# Patient Record
Sex: MALE | Race: OTHER
[De-identification: names, ages, dates, MRNs, and addresses within clinical notes are randomized per-mention and may not be internally consistent; named-entity substitution may affect disease eponyms.]

---

## 2020-01-28 PROBLEM — Z00.00 ENCOUNTER FOR PREVENTIVE HEALTH EXAMINATION: Status: ACTIVE | Noted: 2020-01-28

## 2020-02-03 ENCOUNTER — APPOINTMENT (OUTPATIENT)
Dept: NEUROLOGY | Facility: CLINIC | Age: 50
End: 2020-02-03
Payer: COMMERCIAL

## 2020-02-03 VITALS
WEIGHT: 180 LBS | BODY MASS INDEX: 28.25 KG/M2 | HEIGHT: 67 IN | TEMPERATURE: 98.1 F | HEART RATE: 92 BPM | OXYGEN SATURATION: 97 % | SYSTOLIC BLOOD PRESSURE: 138 MMHG | DIASTOLIC BLOOD PRESSURE: 92 MMHG

## 2020-02-03 DIAGNOSIS — Z86.39 PERSONAL HISTORY OF OTHER ENDOCRINE, NUTRITIONAL AND METABOLIC DISEASE: ICD-10-CM

## 2020-02-03 DIAGNOSIS — Z86.69 PERSONAL HISTORY OF OTHER DISEASES OF THE NERVOUS SYSTEM AND SENSE ORGANS: ICD-10-CM

## 2020-02-03 DIAGNOSIS — Z82.0 FAMILY HISTORY OF EPILEPSY AND OTHER DISEASES OF THE NERVOUS SYSTEM: ICD-10-CM

## 2020-02-03 DIAGNOSIS — G47.33 OBSTRUCTIVE SLEEP APNEA (ADULT) (PEDIATRIC): ICD-10-CM

## 2020-02-03 DIAGNOSIS — Z82.3 FAMILY HISTORY OF STROKE: ICD-10-CM

## 2020-02-03 DIAGNOSIS — Z87.09 PERSONAL HISTORY OF OTHER DISEASES OF THE RESPIRATORY SYSTEM: ICD-10-CM

## 2020-02-03 DIAGNOSIS — Z78.9 OTHER SPECIFIED HEALTH STATUS: ICD-10-CM

## 2020-02-03 DIAGNOSIS — E78.00 PURE HYPERCHOLESTEROLEMIA, UNSPECIFIED: ICD-10-CM

## 2020-02-03 DIAGNOSIS — Z83.438 FAMILY HISTORY OF OTHER DISORDER OF LIPOPROTEIN METABOLISM AND OTHER LIPIDEMIA: ICD-10-CM

## 2020-02-03 DIAGNOSIS — Z87.19 PERSONAL HISTORY OF OTHER DISEASES OF THE DIGESTIVE SYSTEM: ICD-10-CM

## 2020-02-03 PROCEDURE — 99205 OFFICE O/P NEW HI 60 MIN: CPT

## 2020-02-03 RX ORDER — RANITIDINE 150 MG/1
150 TABLET, FILM COATED ORAL
Refills: 0 | Status: ACTIVE | COMMUNITY

## 2020-02-03 RX ORDER — ATORVASTATIN CALCIUM 40 MG/1
40 TABLET, FILM COATED ORAL
Refills: 0 | Status: ACTIVE | COMMUNITY

## 2020-02-04 NOTE — PHYSICAL EXAM
[General Appearance - Alert] : alert [General Appearance - In No Acute Distress] : in no acute distress [General Appearance - Well-Appearing] : healthy appearing [] : normal voice and communication [Mood] : the mood was normal [Affect] : the affect was normal [Cranial Nerves Optic (II)] : visual acuity intact bilaterally,  visual fields full to confrontation, pupils equal round and reactive to light [Cranial Nerves Facial (VII)] : face symmetrical [Cranial Nerves Vestibulocochlear (VIII)] : hearing was intact bilaterally [Cranial Nerves Oculomotor (III)] : extraocular motion intact [Cranial Nerves Trigeminal (V)] : facial sensation intact symmetrically [Cranial Nerves Glossopharyngeal (IX)] : tongue and palate midline [Cranial Nerves Accessory (XI - Cranial And Spinal)] : head turning and shoulder shrug symmetric [Cranial Nerves Hypoglossal (XII)] : there was no tongue deviation with protrusion [Motor Tone] : muscle tone was normal in all four extremities [Motor Handedness Left-Handed] : the patient is left hand dominant [Motor Strength] : muscle strength was normal in all four extremities [1+] : Brachioradialis left 1+ [0] : Patella left 0 [2+] : Ankle jerk left 2+ [___ / 30] : the patient achieved a total score of [unfilled] /30 [___ / 5] : Delayed Recall: [unfilled] / 5 [Paresis Pronator Drift Left-Sided] : no pronator drift on the left [Paresis Pronator Drift Right-Sided] : no pronator drift on the right [Motor Strength Upper Extremities Bilaterally] : strength was normal in both upper extremities [Motor Strength Lower Extremities Bilaterally] : strength was normal in both lower extremities [FreeTextEntry5] : slight flattening of right nasolabial fold. R palpebral fissure slightly smaller than L.

## 2020-02-04 NOTE — REVIEW OF SYSTEMS
[Confused or Disoriented] : confusion [Memory Lapses or Loss] : memory loss [Negative] : Heme/Lymph [Seizures] : no convulsions [Difficulty Walking] : no difficulty walking [Inability to Walk] : able to walk [FreeTextEntry8] : hemorrhoids

## 2020-02-04 NOTE — ASSESSMENT
[FreeTextEntry1] : 1. Memory loss.\par \par Mr Smalls has had loss of memory for the past 2 weeks. This has been a sudden change and not associated with other neurologic symptoms. Head imaging without contrast was unremarkable. His wife is very concerned about his condition.\par \par On examination today, he frequently repeated himself and scored a 21/30 on his MOCA exam. His general neurologic exam was otherwise unremarkable.\par \par Mr Smalls has had a very rapid loss of memory. The differential diagnosis includes rapidly progressive dementia, a psychiatric disorder, or seizure. I discussed my findings with the patient and his wife. I recommend he undergo further testing including MRI of the brain with contrast, neuropsychological testing, and a routine EEG. I advised him not to drive and stay off work until further notice. He will return after his tests are completed.

## 2020-02-04 NOTE — REASON FOR VISIT
[Consultation] : a consultation visit [Spouse] : spouse [FreeTextEntry1] : Hospital follow up. Patient is experiencing symptoms of consistent memory loss.

## 2020-02-04 NOTE — HISTORY OF PRESENT ILLNESS
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 He is accompanied by his wife who provided most of the history. She states that in the past 2 weeks he has been repeating questions and statements repeatedly. He does not remember asking them or saying statements even after a few minutes. He notes that fellow officers at work have told him that he is repeating himself. He has a history of sleep apnea and admits he does not use his CPAP machine.\par \par He continues to work and drive a car. His wife has taken away his gun. He got lost going to his mother in law's house last week but his wife corrected him.\par \par He was seen in the Emergency department at [facility] on January 26. He underwent a head MRI and CT, both without contrast. The MRI scattered deep white matter disease and no acute lesions. He was diagnosed with transient global amnesia and sent home.

## 2020-02-06 ENCOUNTER — RESULT REVIEW (OUTPATIENT)
Age: 50
End: 2020-02-06

## 2020-02-06 ENCOUNTER — APPOINTMENT (OUTPATIENT)
Dept: NEUROLOGY | Facility: CLINIC | Age: 50
End: 2020-02-06
Payer: COMMERCIAL

## 2020-02-06 PROCEDURE — 95819 EEG AWAKE AND ASLEEP: CPT

## 2020-03-24 ENCOUNTER — APPOINTMENT (OUTPATIENT)
Dept: NEUROLOGY | Facility: CLINIC | Age: 50
End: 2020-03-24

## 2020-03-31 ENCOUNTER — APPOINTMENT (OUTPATIENT)
Dept: NEUROLOGY | Facility: CLINIC | Age: 50
End: 2020-03-31
Payer: COMMERCIAL

## 2020-03-31 PROCEDURE — 99214 OFFICE O/P EST MOD 30 MIN: CPT

## 2020-03-31 RX ORDER — DONEPEZIL HYDROCHLORIDE 10 MG/1
10 TABLET ORAL
Qty: 30 | Refills: 5 | Status: ACTIVE | COMMUNITY
Start: 2020-03-31 | End: 1900-01-01

## 2020-03-31 RX ORDER — ATORVASTATIN CALCIUM 80 MG/1
80 TABLET, FILM COATED ORAL
Qty: 30 | Refills: 0 | Status: ACTIVE | COMMUNITY
Start: 2020-03-26

## 2020-03-31 NOTE — ASSESSMENT
[FreeTextEntry1] : 1. Memory loss, rule out paraneoplastic and autoimmune limbic encephalitis.\par \par I discussed the differential diagnosis with the patient and his wife. Will begin treatment with donepezil 10 mg qhs. Will check paraneoplastic panel, NMDA and LANI antibodies, CT chest to rule out lung tumor, US testicles to rule ot testicular tumor.\par \par f/u 2 weeks.

## 2020-04-02 ENCOUNTER — APPOINTMENT (OUTPATIENT)
Dept: NEUROLOGY | Facility: CLINIC | Age: 50
End: 2020-04-02

## 2020-04-08 RX ORDER — QUETIAPINE FUMARATE 25 MG/1
25 TABLET ORAL TWICE DAILY
Qty: 60 | Refills: 3 | Status: ACTIVE | COMMUNITY
Start: 2020-04-08 | End: 1900-01-01

## 2020-04-13 DIAGNOSIS — R41.3 OTHER AMNESIA: ICD-10-CM
